# Patient Record
Sex: FEMALE | Race: WHITE
[De-identification: names, ages, dates, MRNs, and addresses within clinical notes are randomized per-mention and may not be internally consistent; named-entity substitution may affect disease eponyms.]

---

## 2018-04-02 ENCOUNTER — HOSPITAL ENCOUNTER (EMERGENCY)
Dept: HOSPITAL 58 - ED | Age: 68
Discharge: HOME | End: 2018-04-02

## 2018-04-02 VITALS — DIASTOLIC BLOOD PRESSURE: 87 MMHG | SYSTOLIC BLOOD PRESSURE: 181 MMHG

## 2018-04-02 VITALS — BODY MASS INDEX: 28.3 KG/M2

## 2018-04-02 VITALS — TEMPERATURE: 98.7 F

## 2018-04-02 DIAGNOSIS — E11.9: ICD-10-CM

## 2018-04-02 DIAGNOSIS — E03.9: ICD-10-CM

## 2018-04-02 DIAGNOSIS — I10: Primary | ICD-10-CM

## 2018-04-02 DIAGNOSIS — R51: ICD-10-CM

## 2018-04-02 PROCEDURE — 82550 ASSAY OF CK (CPK): CPT

## 2018-04-02 PROCEDURE — 80053 COMPREHEN METABOLIC PANEL: CPT

## 2018-04-02 PROCEDURE — 84484 ASSAY OF TROPONIN QUANT: CPT

## 2018-04-02 PROCEDURE — 99284 EMERGENCY DEPT VISIT MOD MDM: CPT

## 2018-04-02 PROCEDURE — 85025 COMPLETE CBC W/AUTO DIFF WBC: CPT

## 2018-04-02 PROCEDURE — 36415 COLL VENOUS BLD VENIPUNCTURE: CPT

## 2018-04-02 NOTE — ED.PDOC
General


ED Provider: 


Dr. PEÑA THURSTON





Chief Complaint: Hypertension


Stated Complaint: HIGH BLOOD PRESSURE


Time Seen by Physician: 16:20 (NO   LOC   HAS SOME HEADACHE )


Mode of Arrival: Walk-In


Information Source: Patient


Exam Limitations: No limitations


Primary Care Provider: 


JAZMINE COYLE





Nursing and Triage Documentation Reviewed and Agree: Yes


Reviewed sepsis parameters & appropriate labs ordered?: Yes


System Inflammatory Response Syndrome: Not Applicable


Sepsis Protocol: 


For patient's 13 years and over:





Temp is 96.8 and below  and greater


Pulse >90 BPM


Resp >20/minute


Acutely Altered Mental Status





Are patient's symptoms suggestive of a new infection, such as:


   -Pneumonia


   -Skin, Soft Tissue


   -Endocarditis


   -UTI


   -Bone, Joint Infection


   -Implantable Device


   -Acute Abdominal Infection


   -Wound Infection


   -Meningitis


   -Blood Stream Catheter Infection


   -Unknown





System Inflammatory Response Syndrome: Not Applicable





Cardiovascular Complaint Exam





- Hypertension Complaint/Exam


Onset/Duration: 2 HRS /100


Symptoms Are: Still present


Timing: Constant


Reported B/P Prior to Arrival: SEE ABOVE 


Aggravating: Reports: None


Alleviating: Reports: None


Associated Signs and Symptoms: Denies: Chest pain, Vision changes, Anxiety, 

Recent stress, Headache, Numbness, Tingling, Weakness, Dizziness, Short of air, 

Swelling


Related History: Reports: Similar episode


Related Surgical History: Reports: None


Cardiac Risk Factors: Reports: Hypertension


Recent Change in Medications: No


A/V [place]: No


Papilledema Present: No


JVD Present: No


Carotid Bruit Present: No


Femoral Pulses Bounding: No


Differential Diagnoses: Hypertension


Quality Indicator For Non-Traumatic Chest Pain/Syncope: EKG Performed





Review of Systems





- Review Of Systems


Constitutional: Reports: No symptoms


Eyes: Reports: No symptoms


Ears, Nose, Mouth, Throat: Reports: No symptoms


Respiratory: Reports: No symptoms


Cardiac: Reports: Chest pain


GI: Reports: No symptoms


: Reports: No symptoms


Musculoskeletal: Reports: No symptoms


Skin: Reports: No symptoms


Neurological: Reports: No symptoms


Endocrine: Reports: No symptoms


Hematologic/Lymphatic: Reports: No symptoms


All Other Systems: Reviewed and Negative





Past Medical History





- Past Medical History


Endocrine: Reports: DM 2, Hypothyroid


Cardiovascular: Reports: Hypertension


Respiratory: Reports: None


Hematological: Reports: None


Gastrointestinal: Reports: None


Genitourinary: Reports: None


Neuro/Psych: Reports: None


Musculoskeletal: Reports: None


Cancer: Reports: None


Last Menstrual Period: hysterectomy





- Surgical History


General Surgical History: Reports: Lap Band





- Family History


Family History: Reports: Unknown





- Social History


Smoking Status: Never smoker


Hx Substance Use: No


Alcohol Screening: None





Physical Exam





- Physical Exam


Appearance: Well-appearing, No pain distress, Well-nourished


Eyes: DEANA, EOMI, Conjunctiva clear


ENT: Ears normal, Nose normal, Oropharynx normal


Respiratory: Airway patent, Breath sounds clear, Breath sounds equal, 

Respirations nonlabored


Cardiovascular: RRR, Pulses normal, No rub, No murmur


GI/: Soft, Nontender, No masses, Bowel sounds normal, No Organomegaly


Musculoskeletal: Normal strength, ROM intact, No edema, No calf tenderness


Skin: Warm, Dry, Normal color


Neurological: Sensation intact, Motor intact, Reflexes intact, Cranial nerves 

intact, Alert, Oriented


Psychiatric: Affect appropriate, Mood appropriate





Interpretation





- Radiology Interpretation


Radiology Interpretation By: Radiologist


Radiology Results: No acute changes





Critical Care Note





- Critical Care Note


Total Time (mins): 0





Course





- Course


Hematology/Chemistry: 


 04/02/18 16:49





 04/02/18 16:49


Orders, Labs, Meds: 





Lab Review











  04/02/18 04/02/18 04/02/18





  16:49 16:49 16:49


 


WBC  6.52  


 


RBC  4.89  


 


Hgb  14.0  


 


Hct  41.5  


 


MCV  84.9  


 


MCH  28.6  


 


MCHC  33.7  


 


RDW Coeff of Baldo  13.4  


 


Plt Count  250  


 


Immature Gran % (Auto)  0.3  


 


Neut % (Auto)  61.0  


 


Lymph % (Auto)  24.8  


 


Mono % (Auto)  10.4 H  


 


Eos % (Auto)  2.0  


 


Baso % (Auto)  1.5  


 


Immature Gran # (Auto)  0.0  


 


Neut # (Auto)  4.0  


 


Lymph # (Auto)  1.6  


 


Mono # (Auto)  0.7  


 


Eos # (Auto)  0.1  


 


Baso # (Auto)  0.1  


 


Sodium   139 


 


Potassium   4.2 


 


Chloride   105 


 


Carbon Dioxide   23 


 


Anion Gap   15.2 


 


BUN   17 


 


Creatinine   0.85 


 


Estimated GFR (MDRD)   67.00 


 


BUN/Creatinine Ratio   20.00 


 


Glucose   110 


 


Calcium   9.4 


 


Total Bilirubin   0.3 


 


AST   15 


 


ALT   19 


 


Alkaline Phosphatase   58 


 


Total Creatine Kinase    52


 


Troponin I    < 0.0100


 


Total Protein   7.0 


 


Albumin   3.7 


 


Globulin   3.3 


 


Albumin/Globulin Ratio   1.12 








Orders











 Category Date Time Status


 


 CBC W/ AUTO DIFF Stat LAB  04/02/18 16:49 Completed


 


 COMPREHENSIVE METABOLIC PANEL Stat LAB  04/02/18 16:49 Completed


 


 CREATINE KINASE Stat LAB  04/02/18 16:49 Completed


 


 TROPONIN I Stat LAB  04/02/18 16:49 Completed


 


 Lisinopril [Zestril] MEDS  04/02/18 16:37 Discontinued





 20 mg PO ONCE STA   


 


 Lisinopril [Zestril] MEDS  04/02/18 17:55 Stat





 20 mg PO ONCE STA   


 


 CT HEAD W/O CONTRAST Stat RADS  04/02/18 16:53 Completed








Medications











Generic Name Dose Route Start Last Admin





  Trade Name Freq  PRN Reason Stop Dose Admin


 


Lisinopril  20 mg  04/02/18 17:55  





  Zestril  PO  04/02/18 17:56  





  ONCE STA   














Discontinued Medications














Generic Name Dose Route Start Last Admin





  Trade Name Freq  PRN Reason Stop Dose Admin


 


Lisinopril  20 mg  04/02/18 16:37  04/02/18 16:46





  Zestril  PO  04/02/18 16:38  20 mg





  ONCE STA   Administration











Vital Signs: 





 











  Temp Pulse Resp BP Pulse Ox


 


 04/02/18 16:12  98.7 F  73  16  202/93 H  98














ALEIDA Risk Score


ALEIDA Risk Score: 


Risk Score      Odds of death by 30D


      0                 0.1 (0.1-0.2)


      1                 0.3 (0.2-0.3)


      2                 0.4 (0.3-0.5)


      3                 0.7 (0.6-0.9)


      4                 1.2 (1.0-1.5)


      5                 2.2 (1.9-2.6)


      6                 3.0 (2.5-3.6)


      7                 4.8 (3.8-6.1)








Departure





- Departure


Time of Disposition: 19:00


Disposition: HOME SELF-CARE


Discharge Problem: 


Hypertension


Qualifiers:


 Hypertension type: unspecified Qualified Code(s): I10 - Essential (primary) 

hypertension





Instructions:  Hypertension (ED)


Condition: Good


Pt referred to PMD for follow-up: Yes


IPMP verified?: No


Additional Instructions: 


Please call your Family Physician as soon as possible to schedule a follow-up 

appointment.


Allergies/Adverse Reactions: 


Allergies





Penicillins Adverse Reaction (Verified 04/02/18 16:21)


 








Home Medications: 


Ambulatory Orders





Enalapril Maleate [Vasotec] 5 mg PO BID 02/03/14 


Famotidine [Pepcid] 1 tab PO DAILY 02/03/14 


Labetalol HCl [Trandate] 100 tab PO BID 02/03/14 


Calcium Carbonate/Vitamin D3 [Calcium 600 + Vit D Tablet] 1 each PO DAILY 04/02/ 18 


Fluticasone Propionate [Flonase Allergy Relief] 9.9 ml NS DAILY 04/02/18 








Disposition Discussed With: Patient

## 2018-04-02 NOTE — CT
Exam:  Head CT. 

  

Date:  04/02/2018. 

  

Comparison:  None. 

  

HISTORY:  Pain. 

  

TECHNIQUE:  Helical scan of the brain was performed. 

  

FINDINGS:  The calvarium is intact.  The paranasal sinuses and mastoid air cells are clear. 

  

The brainstem and cerebellum are within normal limits.  There is mild cerebral volume loss.  No abnor
mal intra or extra-axial fluid, mass or mass effect is present.  There is no midline shift or hydroce
phalus.  No large vessel infarct or hemorrhages identified. The gray-white interface is maintained. 

  

Impression:  No acute intracranial findings. Senile senescent changes.

## 2018-05-31 ENCOUNTER — HOSPITAL ENCOUNTER (OUTPATIENT)
Dept: HOSPITAL 58 - RAD | Age: 68
Discharge: HOME | End: 2018-05-31
Attending: FAMILY MEDICINE

## 2018-05-31 VITALS — BODY MASS INDEX: 28.3 KG/M2

## 2018-05-31 DIAGNOSIS — Z12.31: Primary | ICD-10-CM

## 2018-05-31 PROCEDURE — 77067 SCR MAMMO BI INCL CAD: CPT

## 2018-06-01 NOTE — MAMMO
EXAM:  Bilateral digital screening mammogram (2-D and 3-D) 

  

History:  Screening 

  

Comparison:  Bilateral mammogram 05/06/2015 

  

Findings:  MLO and CC views of bilateral breasts demonstrate scattered fibroglandular breast parenchy
ma. CAD was reviewed by the radiologist.  Tomosynthesis was performed.  Stable benign calcification w
ithin the left breast.  There are no dominant masses, no suspicious microcalcifications and no mark
ectural distortions. 

  

Impression:  Benign stable mammogram.  Recommend followup routine screening mammography in 1 year. 

  

BIRADS 2

## 2018-06-20 ENCOUNTER — HOSPITAL ENCOUNTER (OUTPATIENT)
Dept: HOSPITAL 58 - RAD | Age: 68
Discharge: HOME | End: 2018-06-20
Attending: FAMILY MEDICINE
Payer: COMMERCIAL

## 2018-06-20 VITALS — BODY MASS INDEX: 28.3 KG/M2

## 2018-06-20 DIAGNOSIS — M79.644: Primary | ICD-10-CM

## 2018-06-20 NOTE — DI
EXAM:  Three views of the right fingers. 

  

History:  Pain of the right third digit. 

  

Findings:  No acute fracture or dislocation.  Mild to moderate polyarticular joint space narrowing wi
th small osteophytes.  Incidental subchondral cyst at the base of the middle phalanx of the third dig
it most likely degenerative in nature.  No periostitis.  Soft tissue swelling of the third digit. 

  

Impression: 

1.  No acute osseous abnormality. 

2.  Mild to moderate osteoarthritis.

## 2018-11-19 ENCOUNTER — HOSPITAL ENCOUNTER (OUTPATIENT)
Dept: HOSPITAL 58 - RAD | Age: 68
Discharge: HOME | End: 2018-11-19
Attending: ORTHOPAEDIC SURGERY
Payer: COMMERCIAL

## 2018-11-19 VITALS — BODY MASS INDEX: 28.3 KG/M2

## 2018-11-19 DIAGNOSIS — M75.81: Primary | ICD-10-CM

## 2018-11-19 NOTE — MRI
EXAM:  MRI right shoulder without contrast. 

  

HISTORY:  Right cuff tendonitis.  Ongoing pain.  No new injury.  No right shoulder surgery reported..
 

  

TECHNIQUE:  Using a local coil on a high field strength magnet multiplanar multisequence MRI was perf
ormed of the right shoulder without intravenous or intra-articular gadolinium contrast.. 

  

COMPARISON:  MRI right shoulder 05/06/2015. 

  

FINDINGS:  I do not have prior radiographs of the right shoulder available for comparison at the time
 of this dictation. 

  

A Type I/I I acromion present.  Coracoacromial ligament/arch intact with thickening.  Right acromiocl
avicular joint degenerative arthrosis/osteoarthrosis.  Deltoid musculature normal signal intensity.  
Free fluid subacromial/subdeltoid bursa. 

  

Muscle bulk of the rotator cuff shows no acute muscle strain or definitive atrophy.  Diffuse supraspi
natus tendinosis.  High-grade tearing supraspinatus with full-thickness extent..  This area of tearin
g is full width and measures upwards of 24 mm in length.  Infraspinatus tendinosis. Posterior inferio
r intact teres minor tendon fibers.  Anterior subscapularis tendinosis with low grade partial thickne
ss rim rent tearing over cranial to central fibers.  The long head of the biceps tendon shows intact 
fibers located in expected position within the bicipital groove with tendinosis and partial thickness
 tearing. 

  

The right humeral head is within normal limit in morphology and seated.  No right glenohumeral joint 
centered subchondral bone marrow edema or bone erosions.  Small right glenohumeral joint effusion.  R
ight glenoid labrum grossly intact on this non-arthrographic examination.. 

  

IMPRESSION:  Right acromioclavicular joint degenerative arthrosis/osteoarthrosis. 

  

Diffuse supraspinatus tendinosis.  High-grade tearing with full-thickness extent.  This is full width
 measuring upwards of 24 mm in length.  Infraspinatus tendinosis.  Free fluid subacromial/subdeltoid 
bursa. 

  

The anterior subscapularis tendinosis with low grade partial thickness rim rent tearing over cranial 
to central fibers.  Proximal long head biceps tendinosis and partial thickness tearing. 

  

Small right glenohumeral joint effusion. 

  

Recommendation is obtainment and correlation with plain film radiographs of the right shoulder as non
e are available for comparison at the time of this dictation.

## 2019-01-24 NOTE — RS.OPPTEV2
Date of Note: 01/24/19


Visit #: 1


Number of visits approved by Insurance: n/a


Date of Evaluation: 01/24/19


Payer Source: MEDICARE


Date of Onset/Injury/Change in Status: 01/11/19


Surgery Performed?: Yes (R total shld replacement 1/11/19)


Treatment Diagnosis: aftercare following total shld replacement, shld pain


History of Condition/Mechanism of Injury:: pt reports has had long history of R 

shld pain. Reports that she underwent R reverse total shld replacement on 1/11/ 19.


Prior Level of Function.....Patient was independent with: ADL's, Self Care, Work

/Vocation, Caregiving, Ambulation/Mobility, Community Integration/Access


Functional Limitations: Sleep, Self Care, ADL's, Reaching, Pushing, Pulling, 

Lifting, Carrying


Current Subjective/complaints:: pt reports that she got her staples out earlier 

this week. States the doctor wants her to work on ROM.


Treatment Side (optional): Right


*Precautions: LIMIT EXTERNAL ROTATION TO NEUTRAL





Medical History


Medical History: Hypertension, Arthritis


Surgical History: Shoulder Replacement (1/11/19)


Smoking Status: Never smoker


Hx Home Medications: tylenol


Patient's Goals: return to normal daily activities with less pain, be able to 

do my own hair.





Pain Assessment





- Pain Description


Pain Location: R shld


Pain Description: Aching


Current Pain Intensity: 4/10





Functional Outcome Measure


UE Functional Index: 22





- G Codes & Severity Modifier


G Codes & Modifier: n/a


Source of G Code score: n/a





Observation





- Observation


Inspection: incisions appear closed no open areas, with no erythema noted.


Posture: Forward Head, Rounded Shoulders, Increased Thoracic Kyphosis


Handedness: Right





Gait





- Gait Pattern


General Gait Pattern Observation: No Deviations/Normal





General


Range of Motion: LUE and BLE WFL's


Muscle Strength: LUE 5/5.  BLE 5/5.  R UE elbow, wrist and hand atleast 3/5,


Shoulder ROM: Left WFL's


Shoulder Muscle Strength: Left WFL's





- Right Shoulder ROM


Right Shoulder Flexion: 61 (PROM)


Right Shoulder Abduction: 50 (PROM)


Right Shoulder Internal Rotation: 28


Right Shoulder External Rotation: 18


Right Shoulder ROM Limitations: Soft Tissue Tightness, Muscle Weakness, Pain





Palpation


Palpation Findings: Tenderness


Comments:: tenderness noted to anterior and lateral shld.





Sensation





- Sensation


Right Upper Extremity: Intact/Normal


Left Upper Extremity: Intact/Normal


Right Lower Extremity: Intact/Normal


Left Lower Extremity: Intact/Normal





Balance





- Sitting Balance


Static Sitting Balance: Normal


Dynamic Sitting Balance: Normal





- Standing Balance


Static Standing Balance: Normal


Dynamic Standing Balance: Normal





- Heat/Cryotherapy


Treatment: Cryotherapy


Comments:: R shld





Interventions





- Exercise/Activities/Manual Therapy


Exercises/Activities: pt received PROM R shld flex, abd, as well as IR and ER 

within pain free ROM. pt performed AAROM flex, pendulum ex, scapular retraction.


Manual Therapy: n/a


HOME EXERCISE PROGRAM: pt given written HEP including gentle scapular retraction

, pendulum, AAROM shld flex.





- Charges


Timed Code Treatment Minutes: 48


Total Treatment Time: 52


Procedures billed for this date of service:: eval low, CP





EVALUATION COMPLEXITY LEVEL


EVALUATION COMPLEXITY LEVEL: HISTORY: Low (HTN, shld replacement), EXAM OF BODY 

SYSTEMS: Medium (ROM, pain, edema, strength), CLINICAL PRESENTATION: Medium, 

CLINICAL DECISION MAKING: Low





Assessment


Assessment: pt presents s/p R total shld replacement. pt presents with edema R 

shld, decreased ROM, decreased strength which limits ability to perform ADL's.


Patient Education: Home Exercise Program, Education of Plan of Care


Rehab Potential: Good





Short Term Goals


Goal #1: pt independent with initial HEP


Goal to be met by: 02/07/19


Goal #2: Improve R shld ROM flex 75 abd 65


Goal to be met by: 02/07/19


Goal #3: pt with decreased edema R shld


Goal to be met by: 02/07/19





Long Term Goals


Goal #1: Improve R shld ROM flex 90 abd 80, IR WFL's


Goal to be met by: 02/21/19


Goal #2: pt able to perform ADL's independently including curling her hair.


Goal to be met by: 02/21/19


Goal #3: Decrease pain <4/10 with activity


Goal to be met by: 02/21/19





Plan





- Treatment to be Provided


Procedures: Therapeutic Exercises, Therapeutic Activity, Manual Therapy, 

Patient Education


Modalities: Electrical Stimulation, Cryotherapy, Hot Packs





- Treatment Plan


Frequency: 2-3x a week


Duration: 4 weeks


Dates of Long Term Goals: 2/21/19


Expiration date of current Insurance Approval:: n/a





- Treatment Code


(1) Aftercare following shoulder joint replacement surgery


Code(s): Z47.1 - AFTERCARE FOLLOWING JOINT REPLACEMENT SURGERY; Z96.619 - 

PRESENCE OF UNSPECIFIED ARTIFICIAL SHOULDER JOINT   


Qualifiers: 


   Laterality: right   Qualified Code(s): Z47.1 - Aftercare following joint 

replacement surgery; Z96.611 - Presence of right artificial shoulder joint   





(2) Pain in joint, shoulder region


Code(s): M25.519 - PAIN IN UNSPECIFIED SHOULDER   


Qualifiers: 


   Laterality: right   Qualified Code(s): M25.511 - Pain in right shoulder   





(3) Effusion of shoulder joint


Code(s): M25.419 - EFFUSION, UNSPECIFIED SHOULDER   


Qualifiers: 


   Laterality: right   Qualified Code(s): M25.411 - Effusion, right shoulder   





(4) Muscle weakness


Code(s): M62.81 - MUSCLE WEAKNESS (GENERALIZED)

## 2019-01-28 NOTE — RS.OPPTDN
Subjective


Date of Note: 01/28/19


Visit #: 2


Number of visits approved by Insurance: NA


Date of Evaluation: 01/24/19


Payer Source: MEDICARE


Treatment Diagnosis: aftercare following total shld replacement, shld pain


Current Subjective/complaints:: Patient reports stiffness and discomfort with 

right shoulder passive flexion. She does report she is pleased with increase in 

flexibility following treatment today.


*Precautions: LIMIT EXTERNAL ROTATION TO NEUTRAL





Pain Assessment





- Pain Description


Pain Location: right shoulder


Pain Description: Aching


Pain Description: soreness


Current Pain Intensity: mild to mod





- Heat/Cryotherapy


Treatment: Cryotherapy ([id number] Ended with CP to the right shoulder following 

EX. Patient in sitting. )





Interventions





- Exercise/Activities/Manual Therapy


Exercises/Activities: PROM right shoulder into flex, abd, and elbow flex/ext.  

AAROM flex. Began isometric shoulder ext and add with manual resistance and 

with pillow. Isometric right shoulder flexion with therapist manual resistance 

and patients left hand for resistance. Wand for short flex/ext and chest press 

with assist throughtout. Pendulum ex, with progession to active shoulder 

flexion (modified cuff series). In sitting, scapular retraction, shoulder shrugs

, and lateral cervical flexion stretching. Table slides. Patient education of dx

, joint mechcanics, safety, and HEP. Patient given copies of new exercises.


Total minutes of Exercise: 44mins 


Manual Therapy: n/a


HOME EXERCISE PROGRAM: pt given written HEP including gentle scapular retraction

, pendulum, AAROM shld flex. Isometric right shoulder flex, ext, and add with 

pillow and manual resistance. Active shoulder flexion in Codman's/cuff series 

position. Assisted wand for short flex/ext and short chest press in supine. 

Table slides.





- Objective Findings


Observations,measurements,etc.: Progress passive right shoulder flexion to 90-

95 degree range.





- Charges


Timed Code Treatment Minutes: 42mins 


Total Treatment Time: 54mins 


Procedures billed for this date of service:: EX3, CP


Assessment: Patient motivated to progress with HEP. She is pleased with 

progression of PROM today.


Patient Education: Education of diagnosis, Body/Joint mechanics, Home Exercise 

Program, Home Safety, Activity Modification


Patient demonstrates compliance with HEP?: Yes





Short Term Goals


Goal #1: pt independent with initial HEP


Goal to be met by: 02/07/19


Progress towards Goal:: Progressing


Goal #2: Improve R shld ROM flex 75 abd 65


Goal to be met by: 02/07/19


Progress towards Goal:: Progressing


Goal #3: pt with decreased edema R shld


Goal to be met by: 02/07/19





Long Term Goals


Goal #1: Improve R shld ROM flex 90 abd 80, IR WFL's


Goal to be met by: 02/21/19


Goal #2: pt able to perform ADL's independently including curling her hair.


Goal to be met by: 02/21/19


Goal #3: Decrease pain <4/10 with activity


Goal to be met by: 02/21/19





Plan


Dates of Long Term Goals: 2/21/19


Expiration date of current Insurance Approval:: 2/21/19


PLAN: Progress with ROM and gentle strengthening of the right shoulder and UE's.

## 2019-01-30 ENCOUNTER — HOSPITAL ENCOUNTER (OUTPATIENT)
Dept: HOSPITAL 58 - REHAB | Age: 69
LOS: 1 days | Discharge: ANOTHER HOSPITAL | End: 2019-01-31
Attending: ORTHOPAEDIC SURGERY

## 2019-01-30 VITALS — BODY MASS INDEX: 28.3 KG/M2

## 2019-01-30 DIAGNOSIS — M25.411: ICD-10-CM

## 2019-01-30 DIAGNOSIS — Z96.611: ICD-10-CM

## 2019-01-30 DIAGNOSIS — Z47.1: Primary | ICD-10-CM

## 2019-01-30 DIAGNOSIS — M62.81: ICD-10-CM

## 2019-01-30 DIAGNOSIS — M25.511: ICD-10-CM

## 2019-01-30 NOTE — RS.OPPTDN
Subjective


Date of Note: 01/30/19


Visit #: 3


Number of visits approved by Insurance: NA


Date of Evaluation: 01/24/19


Payer Source: MEDICARE


Treatment Diagnosis: aftercare following total shld replacement, shld pain


Current Subjective/complaints:: Patient reports mild soreness following last 

session. States she is working on HEP.


*Precautions: LIMIT EXTERNAL ROTATION TO NEUTRAL





Pain Assessment





- Pain Description


Pain Location: right shoulder


Current Pain Intensity: mild to mod





Interventions





- Exercise/Activities/Manual Therapy


Exercises/Activities: PROM right shoulder into flex, abd, and elbow flex/ext.  

AAROM flex. Isometric shoulder ext and add with manual resistance and with 

pillow. Isometric right shoulder IR and ER in neutral. Wand for short flex/ext 

and chest press with assist throughtout. Began wand IR and ER to neutral.  

Isometric IR with patient holding ball. Assisted scap protraction and 

retraction with therapist holding right UE.  In sitting, scapular retraction, 

shoulder shrugs, and lateral cervical flexion stretching. Table slides. Began 

hand clasped assisted bilateral shoulder flexion. Patient education of dx, 

joint mechanics, and HEP.


Total minutes of Exercise: 44mins 


Manual Therapy: n/a


HOME EXERCISE PROGRAM: pt given written HEP including gentle scapular retraction

, pendulum, AAROM shld flex. Isometric right shoulder flex, ext, and add with 

pillow and manual resistance. Active shoulder flexion in Codman's/cuff series 

position. Assisted wand for short flex/ext and short chest press in supine. 

Table slides.





- Charges


Timed Code Treatment Minutes: 44mins 


Total Treatment Time: 44mins 


Procedures billed for this date of service:: EX3


Assessment: Patient with improved tolerance to PROM.


Patient Education: Body/Joint mechanics, Home Exercise Program, Activity 

Modification


Patient demonstrates compliance with HEP?: Yes





Short Term Goals


Goal #1: pt independent with initial HEP


Goal to be met by: 02/07/19


Progress towards Goal:: Progressing


Goal #2: Improve R shld ROM flex 75 abd 65


Goal to be met by: 02/07/19


Progress towards Goal:: Progressing


Goal #3: pt with decreased edema R shld


Goal to be met by: 02/07/19





Long Term Goals


Goal #1: Improve R shld ROM flex 90 abd 80, IR WFL's


Goal to be met by: 02/21/19


Goal #2: pt able to perform ADL's independently including curling her hair.


Goal to be met by: 02/21/19


Goal #3: Decrease pain <4/10 with activity


Goal to be met by: 02/21/19





Plan


Dates of Long Term Goals: 2/21/19


Expiration date of current Insurance Approval:: 2/21/19


PLAN: Progress with PROM and gentle strengthening of the right UE.

## 2019-02-01 NOTE — RS.OPPTDN
Subjective


Date of Note: 02/01/19


Visit #: 4


Number of visits approved by Insurance: NA


Date of Evaluation: 01/24/19


Payer Source: MEDICARE


Treatment Diagnosis: aftercare following total shld replacement, shld pain


Current Subjective/complaints:: Patient reports muscle soreness, but feels she 

is porgressing well with exercise.


*Precautions: LIMIT EXTERNAL ROTATION TO NEUTRAL





Pain Assessment





- Pain Description


Pain Location: right shoulder and upper arm


Pain Description: Tightness, Dull, Aching


Current Pain Intensity: mild to mod





- Heat/Cryotherapy


Treatment: Hot Pack ([id number] to the right shoulder and upper arm prior to EX. 

Patient in supine. )





Interventions





- Exercise/Activities/Manual Therapy


Exercises/Activities: PROM right shoulder into flex, abd, and elbow flex/ext.  

AAROM flex and limited IR/ER. Isometric shoulder ext and add with manual 

resistance and with pillow. Isometric right shoulder IR and ER in neutral. Wand 

for short flex/ext and chest press with assist throughtout. Wand IR and ER to 

neutral.  Isometric IR with patient holding ball. Assisted scap protraction and 

retraction with therapist holding right UE.  In sitting, scapular retraction, 

shoulder shrugs, and lateral cervical flexion stretching.  Assisted table 

slide. Began short range right shoulder flex/ext with patient holding wand for 

assist. Patient education of dx, joint mechanics, and progression of HEP.


Total minutes of Exercise: 43mins 


Manual Therapy: n/a


HOME EXERCISE PROGRAM: pt given written HEP including gentle scapular retraction

, pendulum, AAROM shld flex. Isometric right shoulder flex, ext, and add with 

pillow and manual resistance. Active shoulder flexion in Codman's/cuff series 

position. Assisted wand for short flex/ext and short chest press in supine. 

Table slides.





- Objective Findings


Observations,measurements,etc.: Passive right shoulder flexion to 95 degrees 

today.





- Charges


Timed Code Treatment Minutes: 43mins 


Total Treatment Time: 58mins 


Procedures billed for this date of service:: HP, EX3


Assessment: Patient progressing with PROM and is consistently working on HEP.


Patient Education: Body/Joint mechanics, Home Exercise Program


Patient demonstrates compliance with HEP?: Yes





Short Term Goals


Goal #1: pt independent with initial HEP


Goal to be met by: 02/07/19


Progress towards Goal:: Progressing


Goal #2: Improve R shld ROM flex 75 abd 65


Goal to be met by: 02/07/19


Progress towards Goal:: Progressing


Goal #3: pt with decreased edema R shld


Goal to be met by: 02/07/19





Long Term Goals


Goal #1: Improve R shld ROM flex 90 abd 80, IR WFL's


Goal to be met by: 02/21/19


Goal #2: pt able to perform ADL's independently including curling her hair.


Goal to be met by: 02/21/19


Goal #3: Decrease pain <4/10 with activity


Goal to be met by: 02/21/19





Plan


Dates of Long Term Goals: 2/21/19


Expiration date of current Insurance Approval:: 2/21/19


PLAN: Progress with PROM and AAROM.

## 2019-02-04 NOTE — RS.OPPTDN
Subjective


Date of Note: 02/04/19


Visit #: 5


Number of visits approved by Insurance: NA


Date of Evaluation: 01/24/19


Payer Source: MEDICARE


Treatment Diagnosis: aftercare following total shld replacement, shld pain


Current Subjective/complaints:: Patient states she has been taking off her 

sling more over the weekend cue to right elbow being so sore. States she is 

very cautious when sling is off.  States she is partially able to do her hair.


*Precautions: LIMIT EXTERNAL ROTATION TO NEUTRAL





Pain Assessment





- Pain Description


Pain Location: right shoulder and upper arm


Pain Description: Tightness, Dull, Aching


Current Pain Intensity: 3-4/10





- Heat/Cryotherapy


Treatment: Hot Pack ([id number] to the right shoulder joint prior to EX. Patient 

in supine. )





Interventions





- Exercise/Activities/Manual Therapy


Exercises/Activities: PROM right shoulder into flex, abd, and elbow flex/ext.  

AAROM flex and limited IR/ER. Isometric shoulder ext and add with manual 

resistance and with pillow. Isometric right shoulder IR and ER in neutral. Wand 

for short flex/ext and chest press with assist throughtout. Wand IR and ER to 

neutral.  Isometric IR with patient holding ball. Scap protraction and 

retraction while holding a ball between hands.   In sitting, scapular retraction

, shoulder shrugs, and lateral cervical flexion stretching.  Assisted table 

slide and progress with forward flexion with gentle stretch. Patient education 

of safety with ADL's and progression of HEP.


Total minutes of Exercise: 36mins 


Manual Therapy: n/a


HOME EXERCISE PROGRAM: pt given written HEP including gentle scapular retraction

, pendulum, AAROM shld flex. Isometric right shoulder flex, ext, and add with 

pillow and manual resistance. Active shoulder flexion in Codman's/cuff series 

position. Assisted wand for short flex/ext and short chest press in supine. 

Table slides.





- Charges


Timed Code Treatment Minutes: 36mins 


Total Treatment Time: 51mins


Procedures billed for this date of service:: HP, EX2


Assessment: Patient motivated to progress, but is following safety precautions.


Patient Education: Education of diagnosis, Home Exercise Program, Home Safety, 

Activity Modification


Comments: Discussion of safety when taking off sling.


Patient demonstrates compliance with HEP?: Yes





Short Term Goals


Goal #1: pt independent with initial HEP


Goal to be met by: 02/07/19


Progress towards Goal:: Progressing


Goal #2: Improve R shld ROM flex 75 abd 65


Goal to be met by: 02/07/19


Progress towards Goal:: Progressing


Goal #3: pt with decreased edema R shld


Goal to be met by: 02/07/19


Progress towards Goal:: Progressing





Long Term Goals


Goal #1: Improve R shld ROM flex 90 abd 80, IR WFL's


Goal to be met by: 02/21/19


Goal #2: pt able to perform ADL's independently including curling her hair.


Goal to be met by: 02/21/19


Progress towards goal: Progressing


Goal #3: Decrease pain <4/10 with activity


Goal to be met by: 02/21/19





Plan


Dates of Long Term Goals: 2/21/19


Expiration date of current Insurance Approval:: 2/21/19


PLAN: Progress with ROM and gentle strengthening.

## 2019-02-06 NOTE — RS.OPPTDN
Subjective


Date of Note: 02/06/19


Visit #: 6


Number of visits approved by Insurance: NA


Date of Evaluation: 01/24/19


Payer Source: MEDICARE


Treatment Diagnosis: aftercare following total shld replacement, shld pain


Current Subjective/complaints:: Patient reports she is removing sling for short 

periods as physician said was alright. She reports pain is improving. Reports 

she is progressing with exercises.


*Precautions: LIMIT EXTERNAL ROTATION TO NEUTRAL





Pain Assessment





- Pain Description


Pain Location: right shoulder


Current Pain Intensity: mild at rest, mod with end range stretch





Interventions





- Exercise/Activities/Manual Therapy


Exercises/Activities: PROM right shoulder into flex, abd, and elbow flex/ext.  

AAROM flex and limited IR/ER. Isometric shoulder ext and add with manual 

resistance. Isometric right shoulder IR and ER in neutral. Wand for short flex/

ext and chest press with assist throughtout. Wand IR and ER to neutral.  

Isometric IR with patient holding ball. Scap protraction and retraction while 

holding a ball between hands.


Total minutes of Exercise: 44mins 


Manual Therapy: n/a


HOME EXERCISE PROGRAM: pt given written HEP including gentle scapular retraction

, pendulum, AAROM shld flex. Isometric right shoulder flex, ext, and add with 

pillow and manual resistance. Active shoulder flexion in Codman's/cuff series 

position. Assisted wand for short flex/ext and short chest press in supine. 

Table slides.





- Objective Findings


Observations,measurements,etc.: Passive right shoulder flexion to 132 degrees.





- Charges


Timed Code Treatment Minutes: 44mins 


Total Treatment Time: 47mins 


Procedures billed for this date of service:: EX3


Assessment: Patient progressing well with PROM, AAROM, and limited isometrics. 

She is cautious of surgical precatuions.


Patient Education: Home Exercise Program, Home Safety, Activity Modification


Comments: PT spoke with physicians office to confirm patients surgery was a 

reverse shoulder replacement and precations. Nurse confirmed dx and only 

resriction is ER past neutral.


Patient demonstrates compliance with HEP?: Yes





Short Term Goals


Goal #1: pt independent with initial HEP


Goal to be met by: 02/07/19


Progress towards Goal:: Progressing


Goal #2: Improve R shld ROM flex 75 abd 65


Goal to be met by: 02/07/19


Progress towards Goal:: Progressing


Goal #3: pt with decreased edema R shld


Goal to be met by: 02/07/19


Progress towards Goal:: Progressing





Long Term Goals


Goal #1: Improve R shld ROM flex 90 abd 80, IR WFL's


Goal to be met by: 02/21/19


Goal #2: pt able to perform ADL's independently including curling her hair.


Goal to be met by: 02/21/19


Progress towards goal: Progressing


Goal #3: Decrease pain <4/10 with activity


Goal to be met by: 02/21/19





Plan


Dates of Long Term Goals: 2/21/19


Expiration date of current Insurance Approval:: 2/21/19


PLAN: Progress with PROM and AAROM.

## 2019-02-08 NOTE — RS.OPPTDN
Subjective


Date of Note: 02/08/19


Visit #: 7


Number of visits approved by Insurance: NA


Date of Evaluation: 01/24/19


Payer Source: MEDICARE


Treatment Diagnosis: aftercare following total shld replacement, shld pain


Current Subjective/complaints:: Patient reports she was concerned that she 

injured her anterior right shoulder joint


*Precautions: LIMIT EXTERNAL ROTATION TO NEUTRAL





Pain Assessment





- Pain Description


Pain Location: right shoulder


Pain Description: Tightness, Dull, Aching


Current Pain Intensity: mild at rest





Interventions





- Exercise/Activities/Manual Therapy


Exercises/Activities: PROM right shoulder into flex, abd, and elbow flex/ext.  

AAROM flex and limited IR/ER. Isometric shoulder ext and add with manual 

resistance. Isometric right shoulder IR and ER in neutral. Wand for shoulder 

flex/ext with increased range. Also, chest press with assist throughtout. With 

ball between hands, isometric IR, chest press, short flexion/ext, and triceps 

press overhead, all with assist. Scap protraction and retraction while holding 

a ball between hands. In sitting, shoulder shrugs, scap retraction, and 

assisted cervical lateral flexion stretch.


Total minutes of Exercise: 50mins


Manual Therapy: n/a


HOME EXERCISE PROGRAM: pt given written HEP including gentle scapular retraction

, pendulum, AAROM shld flex. Isometric right shoulder flex, ext, and add with 

pillow and manual resistance. Active shoulder flexion in Codman's/cuff series 

position. Assisted wand for short flex/ext and short chest press in supine. 

Table slides.





- Objective Findings


Observations,measurements,etc.: Passive right shoulder flexion to approx 128 

degrees and abduction to 92 degrees.





- Charges


Timed Code Treatment Minutes: 50mins 


Total Treatment Time: 53mins 


Procedures billed for this date of service:: EX3 


Assessment: Patient progressing well with basic exercise per protocol.


Patient Education: Body/Joint mechanics, Home Exercise Program


Patient demonstrates compliance with HEP?: Yes





Short Term Goals


Goal #1: pt independent with initial HEP


Goal to be met by: 02/07/19


Progress towards Goal:: Progressing


Goal #2: Improve R shld ROM flex 75 abd 65


Goal to be met by: 02/07/19


Progress towards Goal:: Progressing


Goal #3: pt with decreased edema R shld


Goal to be met by: 02/07/19


Progress towards Goal:: Progressing





Long Term Goals


Goal #1: Improve R shld ROM flex 90 abd 80, IR WFL's


Goal to be met by: 02/21/19


Goal #2: pt able to perform ADL's independently including curling her hair.


Goal to be met by: 02/21/19


Progress towards goal: Progressing


Goal #3: Decrease pain <4/10 with activity


Goal to be met by: 02/21/19





Plan


Dates of Long Term Goals: 2/21/19


Expiration date of current Insurance Approval:: 2/21/19


PLAN: Progress with PROM, AAROM, and light isometrics in safe ranges.

## 2019-02-11 NOTE — RS.OPPTDN
Subjective


Date of Note: 02/11/19


Visit #: 8


Number of visits approved by Insurance: NA


Date of Evaluation: 01/24/19


Payer Source: MEDICARE


Treatment Diagnosis: aftercare following total shld replacement, shld pain


Current Subjective/complaints:: Patient reports she is doing more activity at 

home, but follows precautions.


*Precautions: LIMIT EXTERNAL ROTATION TO NEUTRAL





Pain Assessment





- Pain Description


Pain Location: right shoulder


Pain Description: Tightness


Current Pain Intensity: no pain at rest





Interventions





- Exercise/Activities/Manual Therapy


Exercises/Activities: PROM right shoulder into flex, abd, and elbow flex/ext.  

AAROM flex and limited IR/ER. Isometric shoulder ext and add with manual 

resistance. Isometric right shoulder IR and ER in neutral. Wand for shoulder 

flex/ext with increased range, and chest press. Wand with yellow theraband 

resisted extension in short range. With ball between hands, isometric IR, chest 

press, short flexion/ext, and triceps press overhead, all with assist. Scap 

protraction and retraction. Began overhead shoulder pulleys for flexion only.


Total minutes of Exercise: 48mins 


Manual Therapy: n/a


HOME EXERCISE PROGRAM: pt given written HEP including gentle scapular retraction

, pendulum, AAROM shld flex. Isometric right shoulder flex, ext, and add with 

pillow and manual resistance. Active shoulder flexion in Codman's/cuff series 

position. Assisted wand for short flex/ext and short chest press in supine. 

Table slides.





- Charges


Timed Code Treatment Minutes: 48mins 


Total Treatment Time: 50mins 


Procedures billed for this date of service:: EX3


Assessment: Progressing well with exercise and with light ADL's in her home.


Patient Education: Home Exercise Program


Patient demonstrates compliance with HEP?: Yes





Short Term Goals


Goal #1: pt independent with initial HEP


Goal to be met by: 02/07/19


Progress towards Goal:: Progressing


Goal #2: Improve R shld ROM flex 75 abd 65


Goal to be met by: 02/07/19


Progress towards Goal:: Progressing


Goal #3: pt with decreased edema R shld


Goal to be met by: 02/07/19


Progress towards Goal:: Progressing





Long Term Goals


Goal #1: Improve R shld ROM flex 90 abd 80, IR WFL's


Goal to be met by: 02/21/19


Goal #2: pt able to perform ADL's independently including curling her hair.


Goal to be met by: 02/21/19


Progress towards goal: Progressing


Goal #3: Decrease pain <4/10 with activity


Goal to be met by: 02/21/19





Plan


Dates of Long Term Goals: 2/21/19


Expiration date of current Insurance Approval:: 2/21/19


PLAN: Continue progressing ROM and gentle strengthening exercise.

## 2019-02-13 NOTE — RS.OPPTDN
Subjective


Date of Note: 02/13/19


Visit #: 9


Number of visits approved by Insurance: NA


Date of Evaluation: 01/24/19


Payer Source: MEDICARE


Treatment Diagnosis: aftercare following total shld replacement, shld pain


Current Subjective/complaints:: Patient reports soreness at the right anterior 

shoulder joint since doing a modified biceps curl with light weight. States she 

keeps elbow at side with all exercise and ADL's.


*Precautions: LIMIT EXTERNAL ROTATION TO NEUTRAL





Pain Assessment





- Pain Description


Pain Location: right shoulder


Pain Description: Tightness


Pain Description: soreness


Current Pain Intensity: no pain at rest





Interventions





- Exercise/Activities/Manual Therapy


Exercises/Activities: PROM right shoulder into flex, abd, and elbow flex/ext.  

AAROM flex and limited IR/ER. Isometric shoulder ext and add with manual 

resistance. Isometric right shoulder IR and ER in neutral. Wand for shoulder 

flex/ext with increased range, and chest press.  Scap protraction and 

retraction. Discussed overhead shoulder pulleys and  is working on 

setting one up at home.


Total minutes of Exercise: 51mins 


Manual Therapy: Light massage to the right biceps and pectoralis insertion 

during PROM.


HOME EXERCISE PROGRAM: pt given written HEP including gentle scapular retraction

, pendulum, AAROM shld flex. Isometric right shoulder flex, ext, and add with 

pillow and manual resistance. Active shoulder flexion in Codman's/cuff series 

position. Assisted wand for short flex/ext and short chest press in supine. 

Table slides.





- Charges


Timed Code Treatment Minutes: 51mins 


Total Treatment Time: 54mins 


Procedures billed for this date of service:: EX3


Assessment: Patient increased soreness but this resolved with gentle ROM and 

light massage.


Patient Education: Home Exercise Program, Activity Modification


Patient demonstrates compliance with HEP?: Yes





Short Term Goals


Goal #1: pt independent with initial HEP


Goal to be met by: 02/07/19


Progress towards Goal:: Progressing


Goal #2: Improve R shld ROM flex 75 abd 65


Goal to be met by: 02/07/19


Progress towards Goal:: Progressing


Goal #3: pt with decreased edema R shld


Goal to be met by: 02/07/19


Progress towards Goal:: Progressing





Long Term Goals


Goal #1: Improve R shld ROM flex 90 abd 80, IR WFL's


Goal to be met by: 02/21/19


Goal #2: pt able to perform ADL's independently including curling her hair.


Goal to be met by: 02/21/19


Progress towards goal: Progressing


Goal #3: Decrease pain <4/10 with activity


Goal to be met by: 02/21/19


Progress towards goal: Progressing





Plan


Dates of Long Term Goals: 2/21/19


Expiration date of current Insurance Approval:: 2/21/19


PLAN: Continue progression of exercise.

## 2019-02-18 NOTE — RS.OPPTDN
Subjective


Date of Note: 02/15/19


Visit #: 10


Number of visits approved by Insurance: NA


Date of Evaluation: 01/24/19


Payer Source: MEDICARE


Treatment Diagnosis: aftercare following total shld replacement, shld pain


Current Subjective/complaints:: Patient reports right biceps soreness has 

resolved.


*Precautions: LIMIT EXTERNAL ROTATION TO NEUTRAL





Pain Assessment





- Pain Description


Pain Location: right shoulder


Current Pain Intensity: mild





Interventions





- Exercise/Activities/Manual Therapy


Exercises/Activities: PROM right shoulder into flex, abd, and elbow flex/ext.  

AAROM flex and limited IR/ER. Isometric shoulder ext and add with manual 

resistance. Isometric right shoulder IR and ER in neutral. Wand for shoulder 

flex/ext with increased range, and chest press.  Ball on the wall and finger 

ladder.


Total minutes of Exercise: 47mins 


Manual Therapy: Light massage to the right biceps and pectoralis insertion 

during PROM.


Total minutes of Manual Therapy: 4mins 


HOME EXERCISE PROGRAM: pt given written HEP including gentle scapular retraction

, pendulum, AAROM shld flex. Isometric right shoulder flex, ext, and add with 

pillow and manual resistance. Active shoulder flexion in Codman's/cuff series 

position. Assisted wand for short flex/ext and short chest press in supine. 

Table slides.





- Objective Findings


Observations,measurements,etc.: Patient has gained 3 points on the Upper 

Extremity Functional scale. Functional deficit now at 68.75%, partially due to 

post-surgical precautions.





- Charges


Timed Code Treatment Minutes: 51mins 


Total Treatment Time: 51mins 


Procedures billed for this date of service:: EX3


Assessment: Progressing with PROM and HEP. Patient will see physician in approx 

one week, and continuation orders are expected with progression of protocol.


Patient Education: Body/Joint mechanics, Home Exercise Program, Home Safety


Patient demonstrates compliance with HEP?: Yes





Short Term Goals


Goal #1: pt independent with initial HEP


Goal to be met by: 02/07/19


Progress towards Goal:: Progressing


Goal #2: Improve R shld ROM flex 75 abd 65


Goal to be met by: 02/07/19


Progress towards Goal:: Progressing


Goal #3: pt with decreased edema R shld


Goal to be met by: 02/07/19


Progress towards Goal:: Progressing





Long Term Goals


Goal #1: Improve R shld ROM flex 90 abd 80, IR WFL's


Goal to be met by: 02/21/19


Goal #2: pt able to perform ADL's independently including curling her hair.


Goal to be met by: 02/21/19


Progress towards goal: Progressing


Goal #3: Decrease pain <4/10 with activity


Goal to be met by: 02/21/19


Progress towards goal: Progressing





Plan


Dates of Long Term Goals: 2/21/19


Expiration date of current Insurance Approval:: 2/21/19


PLAN: Continue with current POC.

## 2019-02-18 NOTE — RS.PTSUM
Progress Note/Summary


Date of Note: 02/15/19


Date of Evaluation: 01/24/19


Number of Visits: 10


Number of visits approved by Insurance: n/a


Reporting Period for this Progress Note: 1/24/19-2/15/19


Current Complaints/Gains: pt reports pain and soreness getting better with 

increased periods of going without sling in the home.


Objective Measurements/Presentation: Passive R shld flex 142, Improved UE 

functional score 25/80 was 22/80 on eval. pt limited due to continues to have 

surgical restrictions.


G Codes: n/a


Source of G Code Score: n/a





- Short Term Goals


Goal #1: pt independent with initial HEP


Goal to be met by: 02/07/19


Progress towards Goal:: Met


Goal #2: Improve R shld ROM flex 75 abd 65


Goal to be met by: 02/07/19


Progress towards Goal:: Met


Goal #3: pt with decreased edema R shld


Goal to be met by: 02/07/19


Progress towards Goal:: Met





- Long Term Goals


Goal #1: Improve R shld ROM flex 90 abd 80, IR WFL's


Goal to be met by: 02/21/19


Progress towards goal: Progressing


Goal #2: pt able to perform ADL's independently including curling her hair.


Goal to be met by: 02/21/19


Progress towards goal: Progressing


Goal #3: Decrease pain <4/10 with activity


Goal to be met by: 02/21/19


Progress towards goal: Progressing





- Assessment


Assessment of Improvement/Progress: pt has met STG's and is progressing well 

toward LTG's. pt progress continues to be limited due to surgical restrictions. 

pt ROM is progressing as well as decreased pain. pt returns to MD next week and 

anticipate orders to continue and will increase goal dates if orders received.


Summary: Patient has made progress towards goals., Patient demonstrates 

potential to gain increased function with therapy





- Plan


Plan: Continue Plan of Care


Frequency: 2 X week


Duration: 1 week


Dates of Long Term Goals: 2/21/19


Expiration date of current Insurance Approval:: n/a

## 2019-02-18 NOTE — RS.OPPTDN
Subjective


Date of Note: 02/18/19


Visit #: 11


Number of visits approved by Insurance: NA


Date of Evaluation: 01/24/19


Payer Source: MEDICARE


Treatment Diagnosis: aftercare following total shld replacement, shld pain


Current Subjective/complaints:: Patient reports doing more light activity at 

home. States she is taking sling off more often.


*Precautions: LIMIT EXTERNAL ROTATION TO NEUTRAL





Pain Assessment





- Pain Description


Pain Location: right shoulder


Pain Description: soreness


Current Pain Intensity: mild 





- Heat/Cryotherapy


Treatment: Hot Pack ([id number] to the right shoulder prior to EX. Patient in 

supine. )





Interventions





- Exercise/Activities/Manual Therapy


Exercises/Activities: PROM right shoulder flex, abd, and elbow flex/ext.  AAROM 

flex and limited IR/ER. Isometric shoulder ext and add with manual resistance. 

Isometric right shoulder IR and ER in neutral. Wand for shoulder flex/ext with 

increased range, and chest press. Yellow theraband for resistive pulldowns 

short range. In sitting, assisted flexion and abd. Clasped hands shoulder 

flexion. Wand bilateral shoulder flexion. Yellow theraband scap retraction, 

short range.  Ball on the wall. Standing yellow theraband resisted IR, shoulder 

ext, and scap retraction, all short range.


Total minutes of Exercise: 43mins 


Manual Therapy: NA


HOME EXERCISE PROGRAM: pt given written HEP including gentle scapular retraction

, pendulum, AAROM shld flex. Isometric right shoulder flex, ext, and add with 

pillow and manual resistance. Active shoulder flexion in Codman's/cuff series 

position. Assisted wand for short flex/ext and short chest press in supine. 

Table slides.





- Charges


Timed Code Treatment Minutes: 43mins 


Total Treatment Time: 58mins 


Procedures billed for this date of service:: HP, EX3


Assessment: Patient progressing with light strengthening in safe ranges.


Patient Education: Home Exercise Program, Home Safety, Activity Modification


Patient demonstrates compliance with HEP?: Yes





Short Term Goals


Goal #1: pt independent with initial HEP


Goal to be met by: 02/07/19


Progress towards Goal:: Met


Goal #2: Improve R shld ROM flex 75 abd 65


Goal to be met by: 02/07/19


Progress towards Goal:: Progressing


Goal #3: pt with decreased edema R shld


Goal to be met by: 02/07/19


Progress towards Goal:: Progressing





Long Term Goals


Goal #1: Improve R shld ROM flex 90 abd 80, IR WFL's


Goal to be met by: 02/21/19


Goal #2: pt able to perform ADL's independently including curling her hair.


Goal to be met by: 02/21/19


Progress towards goal: Progressing


Goal #3: Decrease pain <4/10 with activity


Goal to be met by: 02/21/19


Progress towards goal: Progressing





Plan


Dates of Long Term Goals: 2/21/19


Expiration date of current Insurance Approval:: 2/21/19


PLAN: Progress with PROM, AAROM, and gentle strengthening in safe ranges.

## 2019-02-20 NOTE — RS.OPPTDN
Subjective


Date of Note: 02/20/19


Visit #: 12


Number of visits approved by Insurance: NA


Date of Evaluation: 01/24/19


Payer Source: MEDICARE


Treatment Diagnosis: aftercare following total shld replacement, shld pain


Current Subjective/complaints:: Patient reports she is seeing progress daily. 

Patient reports she noticed more discomfort in the right UE after wearing the 

sling for several hours yesterday. States right UE pain improves on days she 

removes sling at home. States she has been able to fix her hair with a curling 

iron, including the top and the back.


*Precautions: LIMIT EXTERNAL ROTATION TO NEUTRAL





Pain Assessment





- Pain Description


Pain Location: Right shoulder, upper extremity


Pain Description: soreness





Interventions





- Exercise/Activities/Manual Therapy


Exercises/Activities: PROM right shoulder flex, abd, and elbow flex/ext.  AAROM 

flex and limited IR/ER. Isometric shoulder ext and add with manual resistance. 

Isometric right shoulder IR and ER in neutral. Wand for shoulder flex/ext with 

increased range, and chest press. Yellow theraband for resistive pulldowns 

short range. In sitting, assisted flexion and abd, then active flexion.  

Clasped hands shoulder flexion.


Total minutes of Exercise: 52mins 


Manual Therapy: NA


HOME EXERCISE PROGRAM: pt given written HEP including gentle scapular retraction

, pendulum, AAROM shld flex. Isometric right shoulder flex, ext, and add with 

pillow and manual resistance. Active shoulder flexion in Codman's/cuff series 

position. Assisted wand for short flex/ext and short chest press in supine. 

Table slides.





- Objective Findings


Observations,measurements,etc.: Passive right shoulder flexion (supine) 154 

degrees. Active right shoulder flexion 107 degrees, and active assited shoulder 

flexion 123 degrees.  Right hand  34# and left  37#.





- Charges


Timed Code Treatment Minutes: 52mins 


Total Treatment Time: 52mins 


Procedures billed for this date of service:: EX3


Assessment: Patient progressing well with PROM and AAROM of the right shoulder.


Patient Education: Home Exercise Program, Home Safety, Activity Modification


Patient demonstrates compliance with HEP?: Yes





Short Term Goals


Goal #1: pt independent with initial HEP


Goal to be met by: 02/07/19


Progress towards Goal:: Met


Goal #2: Improve R shld ROM flex 75 abd 65


Goal to be met by: 02/07/19


Progress towards Goal:: Met


Goal #3: pt with decreased edema R shld


Goal to be met by: 02/07/19


Progress towards Goal:: Met





Long Term Goals


Goal #1: Improve R shld ROM flex 90 abd 80, IR WFL's


Goal to be met by: 02/21/19


Progress towards goal: Progressing


Goal #2: pt able to perform ADL's independently including curling her hair.


Goal to be met by: 02/21/19


Progress towards goal: Met


Goal #3: Decrease pain <4/10 with activity


Goal to be met by: 02/21/19


Progress towards goal: Progressing





Plan


Dates of Long Term Goals: 2/21/19


Expiration date of current Insurance Approval:: 2//21/19


PLAN: Patient to see physician for a follow-up appointment. Continuation orders 

are anticipated and will be needed to progress with strengthening.

## 2019-02-22 NOTE — RS.PTSUM
Progress Note/Summary


Date of Note: 02/22/19


Date of Evaluation: 01/24/19


Number of Visits: 13


Number of visits approved by Insurance: n/a


Reporting Period for this Progress Note: 1/24/19-2/22/19


Current Complaints/Gains: pt states that MD was pleased with her progress, MD 

ordered to continue PT for strengthening with no ER past neutral.


Objective Measurements/Presentation: PROM R shld flex in supine 154, AROM R 

shld flex 107, R hand  34#, L hand  37#.  pt received R shld PROM flex, 

abd, elbow flex/ext, AAROM flex and limited IR/ER. Isometric shld ext and add 

with manual resistance. Isometric shld IR/ER in neutral. Wand for shld flex/ext 

and chest press 2 sets of 10 reps. Sitting: assisted flex and abd.


G Codes: n/a


Source of G Code Score: n/a





- Short Term Goals


Goal #1: pt independent with initial HEP


Goal to be met by: 02/07/19


Progress towards Goal:: Met


Goal #2: Improve R shld ROM flex 75 abd 65


Goal to be met by: 02/07/19


Progress towards Goal:: Met


Goal #3: pt with decreased edema R shld


Goal to be met by: 02/07/19


Progress towards Goal:: Met





- Long Term Goals


Goal #1: Improve R shld AROM flex 120 abd 80, IR WFL's


Goal to be met by: 03/15/19 (goal modified 2/22/19)


Goal #2: pt able to perform ADL's independently including curling her hair.


Goal to be met by: 02/21/19


Progress towards goal: Met


Goal #3: Decrease pain <4/10 with activity


Goal to be met by: 03/15/19


Progress towards goal: Progressing





- Assessment


Assessment of Improvement/Progress: pt progressing with R shld ROM as well as 

strength. pt continues to require skilled PT for therex for ROM, strengthening 

and pain control.


Summary: Patient has made progress towards goals., Patient demonstrates 

potential to gain increased function with therapy





- Plan


Plan: Continue Plan of Care


Comments: 





new order received to continue PT 3 wk 6. 


Frequency: 2-3 x a week 


Duration: 3 weeks


Dates of Long Term Goals: 3/15/19


Expiration date of current Insurance Approval:: n/a

## 2019-02-25 NOTE — RS.OPPTDN
Subjective


Date of Note: 02/25/19


Visit #: 14


Number of visits approved by Insurance: NA


Date of Evaluation: 01/24/19


Payer Source: MEDICARE


Treatment Diagnosis: aftercare following total shld replacement, shld pain


Current Subjective/complaints:: Patient reports she saw her physician for a 

follow-up. States he is pleased with her progress and she is to continue 

therapy.


*Precautions: LIMIT EXTERNAL ROTATION TO NEUTRAL





Pain Assessment





- Pain Description


Pain Location: right shoulder


Current Pain Intensity: mild to mod


Other Comments regarding Pain:: Discomfort is mainly in the anterior right 

shoulder joint.





Interventions





- Exercise/Activities/Manual Therapy


Exercises/Activities: PROM right shoulder flex, abd, and elbow flex/ext.  AAROM 

flex and limited IR/ER. Isometric shoulder ext and add with manual resistance. 

Isometric right shoulder IR and ER in neutral. Wand for shoulder flex/ext with 

increased range, and chest press. Yellow theraband for resistive pulldowns 

short range. In sitting, assisted flexion and abd, then active flexion.


Total minutes of Exercise: 46mins 


Manual Therapy: NA


HOME EXERCISE PROGRAM: pt given written HEP including gentle scapular retraction

, pendulum, AAROM shld flex. Isometric right shoulder flex, ext, and add with 

pillow and manual resistance. Active shoulder flexion in Codman's/cuff series 

position. Assisted wand for short flex/ext and short chest press in supine. 

Table slides.





- Charges


Timed Code Treatment Minutes: 46mins 


Total Treatment Time: 48mins 


Procedures billed for this date of service:: EX3


Assessment: Patient progressing well with AAROM and gentle strengthening.


Patient Education: Home Exercise Program


Patient demonstrates compliance with HEP?: Yes





Short Term Goals


Goal #1: pt independent with initial HEP


Goal to be met by: 02/07/19


Progress towards Goal:: Met


Goal #2: Improve R shld ROM flex 75 abd 65


Goal to be met by: 02/07/19


Progress towards Goal:: Met


Goal #3: pt with decreased edema R shld


Goal to be met by: 02/07/19


Progress towards Goal:: Met





Long Term Goals


Goal #1: Improve R shld AROM flex 120 abd 80, IR WFL's


Goal to be met by: 03/15/19 (goal modified 2/22/19)


Progress towards goal: Progressing


Goal #2: pt able to perform ADL's independently including curling her hair.


Goal to be met by: 02/21/19


Progress towards goal: Met


Goal #3: Decrease pain <4/10 with activity


Goal to be met by: 03/15/19


Progress towards goal: Progressing





Plan


Dates of Long Term Goals: 3/15/19


Expiration date of current Insurance Approval:: 3/15/19


PLAN: Progress with AROM and gentle strengthening.

## 2019-02-27 ENCOUNTER — HOSPITAL ENCOUNTER (OUTPATIENT)
Dept: HOSPITAL 58 - REHAB | Age: 69
LOS: 1 days | End: 2019-02-28
Attending: ORTHOPAEDIC SURGERY

## 2019-02-27 VITALS — BODY MASS INDEX: 28.3 KG/M2

## 2019-02-27 DIAGNOSIS — Z47.1: Primary | ICD-10-CM

## 2019-02-27 DIAGNOSIS — Z96.611: ICD-10-CM

## 2019-02-27 DIAGNOSIS — M25.411: ICD-10-CM

## 2019-02-27 DIAGNOSIS — M25.511: ICD-10-CM

## 2019-02-27 DIAGNOSIS — M62.81: ICD-10-CM

## 2019-02-27 NOTE — RS.OPPTDN
Subjective


Date of Note: 02/27/19


Visit #: 15


Number of visits approved by Insurance: NA


Date of Evaluation: 01/24/19


Payer Source: MEDICARE


Treatment Diagnosis: aftercare following total shld replacement, shld pain


Current Subjective/complaints:: Patient reports soreness at the right anterior 

superior shoulder joint. States she did her grocery shopping by herself and may 

have used the right UE more.


*Precautions: LIMIT EXTERNAL ROTATION TO NEUTRAL





Pain Assessment





- Pain Description


Pain Location: right shoulder joint


Pain Description: Tightness


Pain Description: soreness 


Current Pain Intensity: mild to mod


Other Comments regarding Pain:: Reports soreness and a small area that seems to 

be swollen. PT palpated area and spoke with patient. She was reasured she did 

not appear to have injured the right shoulder.





Interventions





- Exercise/Activities/Manual Therapy


Exercises/Activities: PROM right shoulder flex, abd, and elbow flex/ext. 

Isometric shoulder ext and add with manual resistance. Isometric right shoulder 

IR and ER in neutral. Increased to 3# wand for shoulder flex/ext with increased 

range, and chest press. Yellow theraband for resistive pulldowns short range. 

Added yellow theraband resisted chest press and overhead flexion with wand. 

Ball for isometric IR.  In sitting, assisted flexion and abd, then active 

flexion. wand for overhead flexion and limited right shldr abd. Yellow and red 

theraband resisted scap retraction.


Total minutes of Exercise: 51mins 


Manual Therapy: NA


HOME EXERCISE PROGRAM: pt given written HEP including gentle scapular retraction

, pendulum, AAROM shld flex. Isometric right shoulder flex, ext, and add with 

pillow and manual resistance. Active shoulder flexion in Codman's/cuff series 

position. Assisted wand for short flex/ext and short chest press in supine. 

Table slides.





- Charges


Timed Code Treatment Minutes: 51mins 


Total Treatment Time: 51mins 


Procedures billed for this date of service:: EX3


Assessment: Patient progressing with gentle strengthening and with ADL's.


Patient Education: Home Exercise Program, Activity Modification


Patient demonstrates compliance with HEP?: Yes





Short Term Goals


Goal #1: pt independent with initial HEP


Goal to be met by: 02/07/19


Progress towards Goal:: Met


Goal #2: Improve R shld ROM flex 75 abd 65


Goal to be met by: 02/07/19


Progress towards Goal:: Met


Goal #3: pt with decreased edema R shld


Goal to be met by: 02/07/19


Progress towards Goal:: Met





Long Term Goals


Goal #1: Improve R shld AROM flex 120 abd 80, IR WFL's


Goal to be met by: 03/15/19 (goal modified 2/22/19)


Progress towards goal: Progressing


Goal #2: pt able to perform ADL's independently including curling her hair.


Goal to be met by: 02/21/19


Progress towards goal: Met


Goal #3: Decrease pain <4/10 with activity


Goal to be met by: 03/15/19


Progress towards goal: Progressing





Plan


Dates of Long Term Goals: 3/15/19


Expiration date of current Insurance Approval:: 3/15/19


PLAN: Progress AROM and gentle strengthening.

## 2019-03-04 NOTE — RS.PTSUM
Progress Note/Summary


Date of Note: 03/04/19


Date of Evaluation: 01/24/19


Objective Measurements/Presentation: Inadvertantly did not count the week pt 

was going to be out of town, Goal dates to be extended to 3/22/19


G Codes: n/a


Source of G Code Score: n/a





- Short Term Goals


Goal #1: pt independent with initial HEP


Goal to be met by: 02/07/19


Progress towards Goal:: Met


Goal #2: Improve R shld ROM flex 75 abd 65


Goal to be met by: 02/07/19


Progress towards Goal:: Met


Goal #3: pt with decreased edema R shld


Goal to be met by: 02/07/19


Progress towards Goal:: Met





- Long Term Goals


Goal #1: Improve R shld AROM flex 120 abd 80, IR WFL's


Goal to be met by: 03/22/19 (goal modified 2/22/19)


Progress towards goal: Progressing


Goal #2: pt able to perform ADL's independently including curling her hair.


Goal to be met by: 02/21/19


Progress towards goal: Met


Goal #3: Decrease pain <4/10 with activity


Goal to be met by: 03/22/19


Progress towards goal: Progressing





- Assessment


Assessment of Improvement/Progress: goal dates extended due to pt to be out of 

town 3/11-3/15/19.


Summary: Patient has made progress towards goals., Patient demonstrates 

potential to gain increased function with therapy





- Plan


Plan: Continue Plan of Care


Frequency: 2-3 a week 


Duration: 4 weeks


Dates of Long Term Goals: 3/22/19


Expiration date of current Insurance Approval:: n/a

## 2019-03-04 NOTE — RS.OPPTDN
Subjective


Date of Note: 03/04/19


Visit #: 16


Number of visits approved by Insurance: NA


Date of Evaluation: 01/24/19


Payer Source: MEDICARE


Treatment Diagnosis: aftercare following total shld replacement, shld pain


Current Subjective/complaints:: Patient reports soreness continue at the right 

anterior shoulder joint, but feels she is progressing. States she is using the 

right UE more with light ADL's.


*Precautions: LIMIT EXTERNAL ROTATION TO NEUTRAL





Pain Assessment





- Pain Description


Pain Location: Right shoulder


Current Pain Intensity: mild to mod with ROM





Interventions





- Exercise/Activities/Manual Therapy


Exercises/Activities: PROM right shoulder flex, abd, and elbow flex/ext. 

Isometric shoulder ext and add with manual resistance. Isometric right shoulder 

IR and ER in neutral. Began passive ER of the right shoulder.  3# wand for 

shoulder flex/ext with increased range, and chest press. Increased to red 

theraband for resistive pulldowns short range. Added yellow theraband resisted 

chest press and overhead flexion with wand. Red theraband for resist IR. Wand 

for short range IR and ER.   In sitting, assisted flexion and abd, then active 

flexion. Wand for shoulder flexion and abd. Red theraband resisted scap 

retraction.  AA flexion and abd. Cuff series with 1# cuff weight.


Total minutes of Exercise: 50mins 


Manual Therapy: NA


HOME EXERCISE PROGRAM: pt given written HEP including gentle scapular retraction

, pendulum, AAROM shld flex. Isometric right shoulder flex, ext, and add with 

pillow and manual resistance. Active shoulder flexion in Codman's/cuff series 

position. Assisted wand for short flex/ext and short chest press in supine. 

Table slides. Cuff series.





- Charges


Timed Code Treatment Minutes: 50mins 


Total Treatment Time: 52mins 


Procedures billed for this date of service:: EX3


Assessment: Patient progressing well with modified strengthening.


Patient Education: Body/Joint mechanics, Home Exercise Program, Activity 

Modification


Patient demonstrates compliance with HEP?: Yes





Short Term Goals


Goal #1: pt independent with initial HEP


Goal to be met by: 02/07/19


Progress towards Goal:: Met


Goal #2: Improve R shld ROM flex 75 abd 65


Goal to be met by: 02/07/19


Progress towards Goal:: Met


Goal #3: pt with decreased edema R shld


Goal to be met by: 02/07/19


Progress towards Goal:: Met





Long Term Goals


Goal #1: Improve R shld AROM flex 120 abd 80, IR WFL's


Goal to be met by: 03/22/19 (goal modified 2/22/19)


Progress towards goal: Progressing


Goal #2: pt able to perform ADL's independently including curling her hair.


Goal to be met by: 02/21/19


Progress towards goal: Met


Goal #3: Decrease pain <4/10 with activity


Goal to be met by: 03/22/19


Progress towards goal: Progressing





Plan


Dates of Long Term Goals: 3/22/19


Expiration date of current Insurance Approval:: 03/22/19


PLAN: Progress with active reaching and gentle strengthening to increase 

patients functional activity level.

## 2019-03-06 NOTE — RS.OPPTDN
Subjective


Date of Note: 03/06/19


Visit #: 17


Number of visits approved by Insurance: NA


Date of Evaluation: 01/24/19


Payer Source: MEDICARE


Treatment Diagnosis: aftercare following total shld replacement, shld pain


Current Subjective/complaints:: Patient reports doing more daily activities. 

State she did shopping yesterday and over-stretched her right shoulder, but 

soreness has resolved.


*Precautions: LIMIT EXTERNAL ROTATION TO NEUTRAL





Pain Assessment





- Pain Description


Pain Location: right shoulder, upper arm


Pain Description: soreness


Current Pain Intensity: mild 





Interventions





- Exercise/Activities/Manual Therapy


Exercises/Activities: PROM right shoulder flex, abd, and elbow flex/ext. 

Isometric shoulder ext and add with manual resistance. Isometric right shoulder 

IR and ER in neutral. Began passive ER of the right shoulder.  3# wand for 

shoulder flex/ext with increased range, and chest press. Short range flex/ext 

with 1# cuff weight. Red theraband for resistive pulldowns short range. Red 

theraband resisted chest press and overhead flexion with wand. Red theraband 

for resist IR. Wand for short range IR and ER.   In sitting, assisted flexion 

and abd, then active flexion. Holds ball and performs chest press and overhead 

flexion. Wand for shoulder flexion and abd. Red theraband resisted scap 

retraction.  AA flexion and abd. Discussed progression of resistance with cuff 

series.


Total minutes of Exercise: 48mins 


Manual Therapy: NA


HOME EXERCISE PROGRAM: pt given written HEP including gentle scapular retraction

, pendulum, AAROM shld flex. Isometric right shoulder flex, ext, and add with 

pillow and manual resistance. Active shoulder flexion in Codman's/cuff series 

position. Assisted wand for short flex/ext and short chest press in supine. 

Table slides. Cuff series.





- Charges


Timed Code Treatment Minutes: 48mins 


Total Treatment Time: 52mins 


Procedures billed for this date of service:: EX3


Assessment: Patient progressing with ROM and strengthening.


Patient Education: Home Exercise Program, Activity Modification


Patient demonstrates compliance with HEP?: Yes





Short Term Goals


Goal #1: pt independent with initial HEP


Goal to be met by: 02/07/19


Progress towards Goal:: Met


Goal #2: Improve R shld ROM flex 75 abd 65


Goal to be met by: 02/07/19


Progress towards Goal:: Met


Goal #3: pt with decreased edema R shld


Goal to be met by: 02/07/19


Progress towards Goal:: Met





Long Term Goals


Goal #1: Improve R shld AROM flex 120 abd 80, IR WFL's


Goal to be met by: 03/22/19 (goal modified 2/22/19)


Progress towards goal: Progressing


Goal #2: pt able to perform ADL's independently including curling her hair.


Goal to be met by: 02/21/19


Progress towards goal: Met


Goal #3: Decrease pain <4/10 with activity


Goal to be met by: 03/22/19


Progress towards goal: Progressing





Plan


Dates of Long Term Goals: 3/22/19


Expiration date of current Insurance Approval:: 3/22/19


PLAN: Progress with ROM and strengthening of the right UE to increase patients 

functional independence with all daily activities.

## 2019-03-08 NOTE — RS.OPPTDN
Subjective


Date of Note: 03/08/19


Visit #: 18


Number of visits approved by Insurance: NA


Date of Evaluation: 01/24/19


Payer Source: MEDICARE


Treatment Diagnosis: aftercare following total shld replacement, shld pain


Current Subjective/complaints:: Norman reports she is very careful with daily 

activities but is using the right UE more. States she will wear her sling when 

out of town for a convention if she feels she is oversuing the right UE.


*Precautions: LIMIT EXTERNAL ROTATION TO NEUTRAL





Pain Assessment





- Pain Description


Pain Location: right shoulder


Pain Description: soreness


Current Pain Intensity: mild





Interventions





- Exercise/Activities/Manual Therapy


Exercises/Activities: PROM right shoulder flex, abd, and elbow flex/ext. 

Isometric shoulder ext and add with manual resistance. Isometric right shoulder 

IR and ER in neutral. Passive ER of the right shoulder.  3# wand for shoulder 

flex/ext with increased range, and chest press. Short range flex/ext with 1 1/2

# cuff weight. Red theraband for resistive pulldowns short range. Red theraband 

resisted chest press and overhead flexion with wand. Red theraband for resist 

IR. Began yellwo theraband for bilateral ER. All theraband ex in short ranges.  

Wand for short range IR and ER.   In sitting, assisted flexion and abd, then 

active flexion. Holds ball and performs chest press and overhead flexion. Wand 

for shoulder flexion and abd. Red theraband resisted scap retraction.  AA 

flexion and abd.


Total minutes of Exercise: 49mins


Manual Therapy: NA


HOME EXERCISE PROGRAM: pt given written HEP including gentle scapular retraction

, pendulum, AAROM shld flex. Isometric right shoulder flex, ext, and add with 

pillow and manual resistance. Active shoulder flexion in Codman's/cuff series 

position. Assisted wand for short flex/ext and short chest press in supine. 

Table slides. Cuff series.





- Charges


Timed Code Treatment Minutes: 49mins 


Total Treatment Time: 51mins 


Procedures billed for this date of service:: EX3


Assessment: Patient progressing well with strengthening exericse.


Patient demonstrates compliance with HEP?: Yes





Short Term Goals


Goal #1: pt independent with initial HEP


Goal to be met by: 02/07/19


Progress towards Goal:: Met


Goal #2: Improve R shld ROM flex 75 abd 65


Goal to be met by: 02/07/19


Progress towards Goal:: Met


Goal #3: pt with decreased edema R shld


Goal to be met by: 02/07/19


Progress towards Goal:: Met





Long Term Goals


Goal #1: Improve R shld AROM flex 120 abd 80, IR WFL's


Goal to be met by: 03/22/19 (goal modified 2/22/19)


Progress towards goal: Progressing


Goal #2: pt able to perform ADL's independently including curling her hair.


Goal to be met by: 02/21/19


Progress towards goal: Met


Goal #3: Decrease pain <4/10 with activity


Goal to be met by: 03/22/19


Progress towards goal: Progressing





Plan


Dates of Long Term Goals: 3/22/19


Expiration date of current Insurance Approval:: 3/22/19


PLAN: Patient will be out of town next week but will resume treatment the 

following week.

## 2019-03-18 NOTE — RS.OPPTDN
Subjective


Date of Note: 03/18/19


Visit #: 19


Number of visits approved by Insurance: NA


Date of Evaluation: 01/24/19


Payer Source: MEDICARE


Treatment Diagnosis: aftercare following total shld replacement, shld pain


Current Subjective/complaints:: Patient reports she feels she did well last 

week while out of town. States she was careful not to use the right UE too 

much. States she tried to continue a basic HEP. Reports a sharp pain last night 

in the lateral upper arm, but has not bothered her today.


*Precautions: LIMIT EXTERNAL ROTATION TO NEUTRAL





Pain Assessment





- Pain Description


Pain Location: right shoulder and upper arm


Current Pain Intensity: mild





Interventions





- Exercise/Activities/Manual Therapy


Exercises/Activities: PROM right shoulder flex, abd, and elbow flex/ext. 

Isometric shoulder ext and add with manual resistance. Isometric right shoulder 

IR and ER in neutral. Passive ER of the right shoulder.  3# wand for shoulder 

flex/ext with increased range, and chest press. Red theraband for resistive 

pulldowns short range, then added resisted short range flexion and chest press. 

Red theraband for resist right IR and ER. All theraband ex in short ranges.  

Wand for short range IR and ER.   In sitting, assisted flexion and abd, then 

active flexion. Wand for chest press and overhead flexion. Wand for resisted 

scap retraction.  AA flexion and abd. Began ball on the wall.


Total minutes of Exercise: 51mins 


Manual Therapy: NA


HOME EXERCISE PROGRAM: pt given written HEP including gentle scapular retraction

, pendulum, AAROM shld flex. Isometric right shoulder flex, ext, and add with 

pillow and manual resistance. Active shoulder flexion in Codman's/cuff series 

position. Assisted wand for short flex/ext and short chest press in supine. 

Table slides. Cuff series.





- Charges


Timed Code Treatment Minutes: 51mins 


Total Treatment Time: 53mins 


Procedures billed for this date of service:: EX3


Assessment: Patient progressing with gentle strengthening and active reaching.


Patient Education: Body/Joint mechanics, Home Exercise Program, Activity 

Modification


Patient demonstrates compliance with HEP?: Yes





Short Term Goals


Goal #1: pt independent with initial HEP


Goal to be met by: 02/07/19


Progress towards Goal:: Met


Goal #2: Improve R shld ROM flex 75 abd 65


Goal to be met by: 02/07/19


Progress towards Goal:: Met


Goal #3: pt with decreased edema R shld


Goal to be met by: 02/07/19


Progress towards Goal:: Met





Long Term Goals


Goal #1: Improve R shld AROM flex 120 abd 80, IR WFL's


Goal to be met by: 03/22/19 (goal modified 2/22/19)


Progress towards goal: Progressing


Goal #2: pt able to perform ADL's independently including curling her hair.


Goal to be met by: 02/21/19


Progress towards goal: Met


Goal #3: Decrease pain <4/10 with activity


Goal to be met by: 03/22/19


Progress towards goal: Progressing





Plan


Dates of Long Term Goals: 3/22/19


Expiration date of current Insurance Approval:: 3/22/19


PLAN: Progress with AROM and gentle strengthening to increase functional use of 

the right UE.

## 2019-03-20 NOTE — RS.OPPTDN
Subjective


Date of Note: 03/20/19


Visit #: 20


Number of visits approved by Insurance: NA


Date of Evaluation: 01/24/19


Payer Source: MEDICARE


Treatment Diagnosis: aftercare following total shld replacement, shld pain


Current Subjective/complaints:: Patient reports she is pleased with her 

continued progress. States she is doing more daily activities. States she can 

do her hair, some crafts, and light housework with minimal difficulty.


*Precautions: LIMIT EXTERNAL ROTATION TO NEUTRAL





Pain Assessment





- Pain Description


Pain Location: right shoulder


Current Pain Intensity: mild


Other Comments regarding Pain:: Pain increased with some use of the right UE 

with daily activities.





Interventions





- Exercise/Activities/Manual Therapy


Exercises/Activities: PROM right shoulder flex, abd, and elbow flex/ext. 

Isometric shoulder ext and add with manual resistance. Isometric right shoulder 

IR and ER in neutral. Passive ER of the right shoulder. Ball squeeze shoulder IR

, horz abd, and overhead.  3# wand for shoulder flex/ext with increased range, 

and chest press. Red theraband for resistive pulldowns short range, then added 

resisted short range flexion and chest press. Red theraband for resist right IR 

and ER. Wand for short range IR and ER. 1# cuff weight for right shoulder flex, 

scap, and horz abd.  In sitting, assisted flexion and abd, then active flexion. 

3# Wand for overhead flexion. Ball on the wall for isometric protraction.


Total minutes of Exercise: 47mins 


Manual Therapy: NA


HOME EXERCISE PROGRAM: pt given written HEP including gentle scapular retraction

, pendulum, AAROM shld flex. Isometric right shoulder flex, ext, and add with 

pillow and manual resistance. Active shoulder flexion in Codman's/cuff series 

position. Assisted wand for short flex/ext and short chest press in supine. 

Table slides. Cuff series.





- Objective Findings


Observations,measurements,etc.: Active right shoulder flexion 120-123 deg, abd 

115 deg.  MMT 3 to 3+/5 in limited range.   Upper Extremity Functional Scale 

increased to 55/80 or 31.25% (was 22/80 on Eval)





- Charges


Timed Code Treatment Minutes: 47mins 


Total Treatment Time: 47mins 


Procedures billed for this date of service:: EX3


Assessment: Patient continues to report improvement with use of the right UE 

with ADL's.


Patient Education: Body/Joint mechanics, Home Exercise Program, Activity 

Modification


Patient demonstrates compliance with HEP?: Yes





Short Term Goals


Goal #1: pt independent with initial HEP


Goal to be met by: 02/07/19


Progress towards Goal:: Met


Goal #2: Improve R shld ROM flex 75 abd 65


Goal to be met by: 02/07/19


Progress towards Goal:: Met


Goal #3: pt with decreased edema R shld


Goal to be met by: 02/07/19


Progress towards Goal:: Met





Long Term Goals


Goal #1: Improve R shld AROM flex 120 abd 80, IR WFL's


Goal to be met by: 03/22/19 (goal modified 2/22/19)


Progress towards goal: Partially Met


Comments: Rotation limited


Goal #2: pt able to perform ADL's independently including curling her hair.


Goal to be met by: 02/21/19


Progress towards goal: Met


Goal #3: Decrease pain <4/10 with activity


Goal to be met by: 03/22/19


Progress towards goal: Progressing





Plan


Dates of Long Term Goals: 3/22/19


Expiration date of current Insurance Approval:: 3/22/19


PLAN: Continue this week to progress with goals, complete physicians orders, 

and increase functional use of the right UE with ADL's.

## 2019-03-22 ENCOUNTER — HOSPITAL ENCOUNTER (OUTPATIENT)
Dept: HOSPITAL 58 - REHAB | Age: 69
LOS: 9 days | End: 2019-03-31
Attending: ORTHOPAEDIC SURGERY

## 2019-03-22 VITALS — BODY MASS INDEX: 28.3 KG/M2

## 2019-03-22 DIAGNOSIS — M25.411: ICD-10-CM

## 2019-03-22 DIAGNOSIS — M62.81: ICD-10-CM

## 2019-03-22 DIAGNOSIS — Z96.611: ICD-10-CM

## 2019-03-22 DIAGNOSIS — M25.511: ICD-10-CM

## 2019-03-22 DIAGNOSIS — Z47.1: Primary | ICD-10-CM

## 2019-03-22 NOTE — RS.PTSUM
Progress Note/Summary


Date of Note: 03/20/19


Date of Evaluation: 01/24/19


Number of Visits: 20


Number of visits approved by Insurance: n/a


Reporting Period for this Progress Note: 1/24/19-3/20/19


Current Complaints/Gains: pt reports progress with use of RUE with all ADL's. 

Reports she is able to do her own hair, crafts and light housework with minimal 

difficulty. Consistent with HEP.


Objective Measurements/Presentation: pt R shld active flex 120-123, abd 115. R 

shld strength 3 to 3+/5 in limited ROM. Independent with initial HEP. UE 

functional scale has improved from 22/80 to 55/80.


G Codes: n/a


Source of G Code Score: n/a





- Short Term Goals


Goal #1: pt independent with initial HEP


Goal to be met by: 02/07/19


Progress towards Goal:: Met


Goal #2: Improve R shld ROM flex 75 abd 65


Goal to be met by: 02/07/19


Progress towards Goal:: Met


Goal #3: pt with decreased edema R shld


Goal to be met by: 02/07/19


Progress towards Goal:: Met





- Long Term Goals


Goal #1: Improve R shld AROM flex 120 abd 80, IR WFL's


Goal to be met by: 03/22/19 (goal modified 2/22/19)


Progress towards goal: Progressing


Goal #2: pt able to perform ADL's independently including curling her hair.


Goal to be met by: 02/21/19


Progress towards goal: Met


Goal #3: Decrease pain <4/10 with activity


Goal to be met by: 03/22/19


Progress towards goal: Progressing





- Assessment


Assessment of Improvement/Progress: pt has met all STG as well as LTG 2. pt has 

made improvement with ROM as well as strength and improved functional ability.


Summary: Patient has made progress towards goals., Patient demonstrates 

potential to gain increased function with therapy





- Plan


Plan: Continue Plan of Care


Comments: 





plan to dc this week with focus on independence with all regular ADL's


Frequency: 2 X week


Duration: 1 week


Dates of Long Term Goals: 3/22/19


Expiration date of current Insurance Approval:: n/a

## 2019-03-22 NOTE — RS.OPPTDC
Date of Discharge: 03/22/19


Date of Evaluation: 01/24/19


Number of Visits: 21


Treatment Diagnosis: aftercare following total shld replacement, shld pain


Current Level of Function: AROM R shld flex 158 abd 162. R  strength 28#, L 

 strength 24#. pt is independent with HEP. pt has met 6/6 treatment goals.


Current Complaints/Gains: pt is pleased with progress. Performing more light ADL

's and plans to continue HEP.





Functional Outcome Measure


UE Functional Index: 55





- G Codes & Severity Modifier


G Codes & Modifier: n/a


Source of G Code score: n/a





Observation





- Observation


Posture: Forward Head, Rounded Shoulders





Gait





- Gait Pattern


General Gait Pattern Observation: No Deviations/Normal





Interventions





- Exercise/Activities/Manual Therapy


Exercises/Activities: n/a


Manual Therapy: NA


HOME EXERCISE PROGRAM: pt given written HEP including gentle scapular retraction

, pendulum, AAROM shld flex. Isometric right shoulder flex, ext, and add with 

pillow and manual resistance. Active shoulder flexion in Codman's/cuff series 

position. Assisted wand for short flex/ext and short chest press in supine. 

Table slides. Cuff series. Ball on the wall.





- Charges


Timed Code Treatment Minutes: n/a


Total Treatment Time: n/a


Procedures billed for this date of service:: n/a





Assessment


Assessment: pt has met all goals. pt is independent with HEP . pt has improved 

to being independent with ADL's and ROM WFL's.


Patient Education: Home Exercise Program, Education of Plan of Care


Rehab Potential: Good





Short Term Goals


Goal #1: pt independent with initial HEP


Goal to be met by: 02/07/19


Progress towards Goal:: Met


Goal #2: Improve R shld ROM flex 75 abd 65


Goal to be met by: 02/07/19


Progress towards Goal:: Met


Goal #3: pt with decreased edema R shld


Goal to be met by: 02/07/19


Progress towards Goal:: Met





Long Term Goals


Goal #1: Improve R shld AROM flex 120 abd 80, IR WFL's


Goal to be met by: 03/22/19 (goal modified 2/22/19)


Progress towards goal: Met


Goal #2: pt able to perform ADL's independently including curling her hair.


Goal to be met by: 02/21/19


Progress towards goal: Met


Goal #3: Decrease pain <4/10 with activity


Goal to be met by: 03/22/19


Progress towards goal: Met





Plan


Reason for Discharge:: All Goals Met

## 2019-03-22 NOTE — RS.OPPTDN
Subjective


Date of Note: 03/22/19


Visit #: 21


Number of visits approved by Insurance: NA


Date of Evaluation: 01/24/19


Payer Source: MEDICARE


Treatment Diagnosis: aftercare following total shld replacement, shld pain


Current Subjective/complaints:: Patient reports she is pleased with her 

progress. States she knows she has a way to go before she can do all daily 

activities, but feels she can continue HEP and make progress.


*Precautions: LIMIT EXTERNAL ROTATION TO NEUTRAL





Pain Assessment





- Pain Description


Pain Location: right shoulder


Current Pain Intensity: mild 





Interventions





- Exercise/Activities/Manual Therapy


Exercises/Activities: PROM right shoulder flex, abd, and elbow flex/ext. 

Isometric shoulder ext and add with manual resistance. Isometric right shoulder 

IR and ER in neutral. Passive ER of the right shoulder. Ball squeeze shoulder IR

, horz abd, and overhead.  3# wand for shoulder flex/ext with increased range, 

and chest press. 2# ball for proprioception work with right at 90 degrees 

flexion. Red theraband for resist right IR and ER. Wand for short range IR and 

ER. 1# cuff weight for right shoulder flex, scap, and horz abd.  In sitting, 

assisted flexion and abd, then active flexion. 3# Wand for overhead flexion. 

Green theraband for scap retraction. Ball on the wall for isometric protraction.


Total minutes of Exercise: 49mins 


Manual Therapy: NA


HOME EXERCISE PROGRAM: pt given written HEP including gentle scapular retraction

, pendulum, AAROM shld flex. Isometric right shoulder flex, ext, and add with 

pillow and manual resistance. Active shoulder flexion in Codman's/cuff series 

position. Assisted wand for short flex/ext and short chest press in supine. 

Table slides. Cuff series. Ball on the wall.





- Objective Findings


Observations,measurements,etc.: Active right shoulder flexion 158 degrees and 

assisted to 162 degrees. Right hand  to 28# (left 24#)





- Charges


Timed Code Treatment Minutes: 49mins 


Total Treatment Time: 52mins 


Procedures billed for this date of service:: EX3


Assessment: Patient has progressed well and benefitted from treatment. She has 

completed all orders and met all 6 treatment goals.


Patient Education: Body/Joint mechanics, Home Exercise Program, Home Safety, 

Activity Modification


Patient demonstrates compliance with HEP?: Yes





Short Term Goals


Goal #1: pt independent with initial HEP


Goal to be met by: 02/07/19


Progress towards Goal:: Met


Goal #2: Improve R shld ROM flex 75 abd 65


Goal to be met by: 02/07/19


Progress towards Goal:: Met


Goal #3: pt with decreased edema R shld


Goal to be met by: 02/07/19


Progress towards Goal:: Met





Long Term Goals


Goal #1: Improve R shld AROM flex 120 abd 80, IR WFL's


Goal to be met by: 03/22/19 (goal modified 2/22/19)


Progress towards goal: Met


Goal #2: pt able to perform ADL's independently including curling her hair.


Goal to be met by: 02/21/19


Progress towards goal: Met


Goal #3: Decrease pain <4/10 with activity


Goal to be met by: 03/22/19


Progress towards goal: Met





Plan


Dates of Long Term Goals: 3/22/19


Expiration date of current Insurance Approval:: 3/22/19


PLAN: Disscharge with HEP.

## 2019-06-04 ENCOUNTER — HOSPITAL ENCOUNTER (OUTPATIENT)
Dept: HOSPITAL 58 - RAD | Age: 69
Discharge: HOME | End: 2019-06-04
Attending: FAMILY MEDICINE
Payer: COMMERCIAL

## 2019-06-04 VITALS — BODY MASS INDEX: 28.3 KG/M2

## 2019-06-04 DIAGNOSIS — Z12.31: Primary | ICD-10-CM

## 2019-06-04 DIAGNOSIS — E89.40: ICD-10-CM

## 2019-06-04 DIAGNOSIS — Z78.0: ICD-10-CM

## 2019-06-04 DIAGNOSIS — Z91.89: ICD-10-CM

## 2019-06-04 NOTE — DEXA
EXAM:  Bone densitometry. 

  

History:  Postmenopausal. 

  

Findings: 

  

Evaluation of the lumbar spine reveals a total bone mineral density of 1.475 grams per centimeter squ
ared with T-score of 2.5. 

  

Evaluation of the left hip reveals a total bone mineral density of 0.973 grams per centimeter squared
 with T-score of negative 0.3. 

  

Evaluation of the right hip reveals a total bone mineral density of 1.021 grams per centimeter square
d with T-score of 0.1. 

  

FRAX:  10-year probability for major osteoporotic fracture is 9.8% and 0.7% for hip fracture. 

  

Impression:  Normal bone mineral density of the lumbar spine and bilateral hips

## 2019-06-05 NOTE — MAMMO
EXAM:  Bilateral digital screening mammogram (2-D and 3-D) 

  

History:  Screening 

  

Comparison:  Bilateral mammogram 05/31/2018 

  

Findings:  MLO and CC views of bilateral breasts demonstrate scattered fibroglandular breast parenchy
ma.  CAD was reviewed by the radiologist.  Tomosynthesis was performed.  There are no dominant masses
, no suspicious microcalcifications and no architectural distortions 

  

Impression:  Stable negative mammogram.  Recommend followup routine screening mammography in 1 year. 


  

BI-RADS 1, negative